# Patient Record
Sex: MALE | Race: WHITE
[De-identification: names, ages, dates, MRNs, and addresses within clinical notes are randomized per-mention and may not be internally consistent; named-entity substitution may affect disease eponyms.]

---

## 2017-07-05 ENCOUNTER — HOSPITAL ENCOUNTER (INPATIENT)
Dept: HOSPITAL 84 - D.ER | Age: 52
LOS: 2 days | Discharge: HOME | DRG: 65 | End: 2017-07-07
Attending: FAMILY MEDICINE | Admitting: FAMILY MEDICINE
Payer: COMMERCIAL

## 2017-07-05 VITALS — BODY MASS INDEX: 41.23 KG/M2 | WEIGHT: 210 LBS | HEIGHT: 60 IN

## 2017-07-05 DIAGNOSIS — G47.33: ICD-10-CM

## 2017-07-05 DIAGNOSIS — E78.5: ICD-10-CM

## 2017-07-05 DIAGNOSIS — I63.9: Primary | ICD-10-CM

## 2017-07-05 DIAGNOSIS — I25.10: ICD-10-CM

## 2017-07-05 DIAGNOSIS — R53.1: ICD-10-CM

## 2017-07-05 DIAGNOSIS — I10: ICD-10-CM

## 2017-07-05 DIAGNOSIS — L50.8: ICD-10-CM

## 2017-07-05 DIAGNOSIS — E87.1: ICD-10-CM

## 2017-07-05 DIAGNOSIS — I69.351: ICD-10-CM

## 2017-07-05 DIAGNOSIS — E11.65: ICD-10-CM
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
## 2017-07-05 DIAGNOSIS — D64.9: ICD-10-CM

## 2017-07-05 DIAGNOSIS — N17.9: ICD-10-CM

## 2017-07-05 DIAGNOSIS — T50.8X5A: ICD-10-CM

## 2017-07-05 LAB
ALBUMIN SERPL-MCNC: 3.7 G/DL (ref 3.4–5)
ALP SERPL-CCNC: 200 U/L (ref 46–116)
ALT SERPL-CCNC: 182 U/L (ref 10–68)
ANION GAP SERPL CALC-SCNC: 14.1 MMOL/L (ref 8–16)
APTT BLD: 25.4 SECONDS (ref 22.8–39.4)
BASOPHILS NFR BLD AUTO: 0.1 % (ref 0–2)
BILIRUB SERPL-MCNC: 0.3 MG/DL (ref 0.2–1.3)
BUN SERPL-MCNC: 15 MG/DL (ref 7–18)
CALCIUM SERPL-MCNC: 9.2 MG/DL (ref 8.5–10.1)
CHLORIDE SERPL-SCNC: 98 MMOL/L (ref 98–107)
CK SERPL-CCNC: 118 UL (ref 21–232)
CO2 SERPL-SCNC: 27.6 MMOL/L (ref 21–32)
CREAT SERPL-MCNC: 1.5 MG/DL (ref 0.6–1.3)
EOSINOPHIL NFR BLD: 1.8 % (ref 0–7)
ERYTHROCYTE [DISTWIDTH] IN BLOOD BY AUTOMATED COUNT: 12.6 % (ref 11.5–14.5)
GLOBULIN SER-MCNC: 3.3 G/L
GLUCOSE SERPL-MCNC: 251 MG/DL (ref 74–106)
HCT VFR BLD CALC: 41.9 % (ref 42–54)
HGB BLD-MCNC: 14 G/DL (ref 13.5–17.5)
IMM GRANULOCYTES NFR BLD: 0.2 % (ref 0–5)
INR PPP: 0.93 (ref 0.85–1.17)
LYMPHOCYTES NFR BLD AUTO: 19 % (ref 15–50)
MCH RBC QN AUTO: 30.7 PG (ref 26–34)
MCHC RBC AUTO-ENTMCNC: 33.4 G/DL (ref 31–37)
MCV RBC: 91.9 FL (ref 80–100)
MONOCYTES NFR BLD: 8.9 % (ref 2–11)
NEUTROPHILS NFR BLD AUTO: 70 % (ref 40–80)
OSMOLALITY SERPL CALC.SUM OF ELEC: 278 MOSM/KG (ref 275–300)
PLATELET # BLD: 248 10X3/UL (ref 130–400)
PMV BLD AUTO: 11 FL (ref 7.4–10.4)
POTASSIUM SERPL-SCNC: 4.7 MMOL/L (ref 3.5–5.1)
PROT SERPL-MCNC: 7 G/DL (ref 6.4–8.2)
PROTHROMBIN TIME: 12.3 SECONDS (ref 11.6–15)
RBC # BLD AUTO: 4.56 10X6/UL (ref 4.2–6.1)
SODIUM SERPL-SCNC: 135 MMOL/L (ref 136–145)
TROPONIN I SERPL-MCNC: < 0.017 NG/ML (ref 0–0.06)
WBC # BLD AUTO: 8.5 10X3/UL (ref 4.8–10.8)

## 2017-07-05 NOTE — EC
PATIENT:STEPHEN BHATTI               DATE OF SERVICE: 07/07/17
SEX: M                                  MEDICAL RECORD: [id number]
DATE OF BIRTH: [birth year]                        LOCATION:D.MS [id number]
AGE OF PATIENT: 51                             ADMISSION DATE: 07/05/17
 
REFERRING PHYSICIAN:                               
 
INTERPRETING PHYSICIAN: KATALINA LUNA MD             
 
 
 
                             ECHOCARDIOGRAM REPORT
  ECHO CHARGES 4               ECHO COMPLETE            
 
 
 
CLINICAL DIAGNOSIS: L MCA LACUNE/ ASSESS FOR CLOT 
                    HX OF CAD/STENTS/HTN          
                         ECHOCARDIOGRAPHIC MEASUREMENTS
      (adult normal given)
        AC root (d.<3.7cm) 4.1    LV Septum d (<1.2 cm> 1.5 
           Valve Excursion 1.9      LV Septum (systole) 2.0 
     Left Atria (s.<4.0cm> 2.7           LVPW d(<1.2cm) 1.6 
             RV (d.<2.3cm) 3.6            LVPW (sytole) 2.2 
       LV diastole(<5.6CM) 5.2        MV E-F(>70mm/sec)     
                LV systole 3.2            LVOT Diameter 2.0 
            MV exc.(>10mm) 1.5 
Est.ejection fraction (50-75%)     Pericardial Effusion N
 
   DOPPLER:
     LVIT       A 91.0 E 82.0
       LA      RVSP 17  
     LVOT 98   AOP1/2T     
  Asc. Ao 158 
     RVOT 94  
       RA     
        
 AV Gradient Peak 9.52  AV Mean 4.87  AV Area 2.5 
 MV Gradient Peak 7.45  MV Mean 2.13  MV Area     
   COMMENTS: PA  CM/SEC OR 3.55 MMHG,  RVSP IS      
              17 MMHG                                     
 
 Cardiac Sonographer: Brianda CRUMP              
      Cardiologist:1          Dr. Luna                
             TAPE# PACS           
                                                                                     
 
 
TWO-DIMENSIONAL ECHOCARDIOGRAM WITH DOPPLER 
 
1. Left ventricular chamber size is within normal limits.  Left ventricular systolic
function is normal.  Overall ejection fraction is estimated at 55 percent.
2. Left atrium, right atrium, and right ventricular chamber sizes are within normal
limits.
3. Valvular structures have normal structure and motion.
4. Doppler interrogation only reveals trace tricuspid regurgitation; no other
valvular insufficiency or stenosis.  
5. No evidence of pericardial effusion or left ventricular thrombus.   
 
 
 
ECHOCARDIOGRAM REPORT                          [id number]    STEPHEN BHATTI       
 
 
6. No cardiac source of neurologic emboli.
                                           
                                           KATALINA LUNA MD             
 
 
 
 
 
 
 
 
 
 
 
 
 
 
 
 
 
 
 
 
 
 
 
 
 
 
 
 
 
 
 
 
 
 
 
 
 
 
 
 
 
 
 
CC:                                                             [id number]
DICTATION DATE: 07/08/17 1500     : DM  07/10/17 1145      DIS IN  
                                                                      07/07/17
[facility]                                          
[address] [place] [address]

## 2017-07-06 VITALS — SYSTOLIC BLOOD PRESSURE: 135 MMHG | DIASTOLIC BLOOD PRESSURE: 85 MMHG

## 2017-07-06 VITALS — DIASTOLIC BLOOD PRESSURE: 84 MMHG | SYSTOLIC BLOOD PRESSURE: 124 MMHG

## 2017-07-06 VITALS — SYSTOLIC BLOOD PRESSURE: 129 MMHG | DIASTOLIC BLOOD PRESSURE: 78 MMHG

## 2017-07-06 VITALS — DIASTOLIC BLOOD PRESSURE: 78 MMHG | SYSTOLIC BLOOD PRESSURE: 122 MMHG

## 2017-07-06 VITALS — DIASTOLIC BLOOD PRESSURE: 77 MMHG | SYSTOLIC BLOOD PRESSURE: 133 MMHG

## 2017-07-06 VITALS — DIASTOLIC BLOOD PRESSURE: 80 MMHG | SYSTOLIC BLOOD PRESSURE: 134 MMHG

## 2017-07-06 LAB
ALBUMIN SERPL-MCNC: 3.4 G/DL (ref 3.4–5)
ALP SERPL-CCNC: 143 U/L (ref 46–116)
ALT SERPL-CCNC: 150 U/L (ref 10–68)
ANION GAP SERPL CALC-SCNC: 13.9 MMOL/L (ref 8–16)
BASOPHILS NFR BLD AUTO: 0.1 % (ref 0–2)
BILIRUB SERPL-MCNC: 0.43 MG/DL (ref 0.2–1.3)
BUN SERPL-MCNC: 14 MG/DL (ref 7–18)
CALCIUM SERPL-MCNC: 8.7 MG/DL (ref 8.5–10.1)
CHLORIDE SERPL-SCNC: 98 MMOL/L (ref 98–107)
CHOLEST/HDLC SERPL: 3.3 RATIO (ref 2.3–4.9)
CO2 SERPL-SCNC: 24.9 MMOL/L (ref 21–32)
CREAT SERPL-MCNC: 1.1 MG/DL (ref 0.6–1.3)
EOSINOPHIL NFR BLD: 2 % (ref 0–7)
ERYTHROCYTE [DISTWIDTH] IN BLOOD BY AUTOMATED COUNT: 12.7 % (ref 11.5–14.5)
EST. AVERAGE GLUCOSE BLD GHB EST-MCNC: 200 MG/DL (ref 74–154)
GLOBULIN SER-MCNC: 3.6 G/L
GLUCOSE SERPL-MCNC: 227 MG/DL (ref 74–106)
HBA1C MFR BLD: 8.6 % (ref 4.8–6)
HCT VFR BLD CALC: 40.5 % (ref 42–54)
HDLC SERPL-MCNC: 65 MG/DL (ref 32–96)
HGB BLD-MCNC: 13.6 G/DL (ref 13.5–17.5)
IMM GRANULOCYTES NFR BLD: 0.3 % (ref 0–5)
LDL-HDL RATIO: 1.8 RATIO (ref 1.5–3.5)
LDLC SERPL-MCNC: 114 MG/DL (ref 0–100)
LYMPHOCYTES NFR BLD AUTO: 19.5 % (ref 15–50)
MCH RBC QN AUTO: 30.6 PG (ref 26–34)
MCHC RBC AUTO-ENTMCNC: 33.6 G/DL (ref 31–37)
MCV RBC: 91 FL (ref 80–100)
MONOCYTES NFR BLD: 6.5 % (ref 2–11)
NEUTROPHILS NFR BLD AUTO: 71.6 % (ref 40–80)
OSMOLALITY SERPL CALC.SUM OF ELEC: 273 MOSM/KG (ref 275–300)
PLATELET # BLD: 226 10X3/UL (ref 130–400)
PMV BLD AUTO: 10.9 FL (ref 7.4–10.4)
POTASSIUM SERPL-SCNC: 3.8 MMOL/L (ref 3.5–5.1)
PROT SERPL-MCNC: 7 G/DL (ref 6.4–8.2)
RBC # BLD AUTO: 4.45 10X6/UL (ref 4.2–6.1)
SODIUM SERPL-SCNC: 133 MMOL/L (ref 136–145)
TRIGL SERPL-MCNC: 164 MG/DL (ref 30–200)
WBC # BLD AUTO: 7.5 10X3/UL (ref 4.8–10.8)

## 2017-07-06 NOTE — NUR
ALERT,ORIENTED X 3. NO COMPLAINTS VOICED. SL INTACT TO LEFT ARM WITHOUT
REDNESS OR EDEMA NOTED. CL IN REACH

## 2017-07-06 NOTE — NUR
ADMITTED TO ROOM 2240 VIA WHEELCHAIR FROM ER. CC OF RIGHT SIDED WEAKENSS.
ALERT,ORIENTED X 3. SL TO LEFT AC WITHOUT REDNESS OR EDEMA NOTED..ORIENTED TO
ROOM CL IN REACH

## 2017-07-06 NOTE — NUR
PATIENT BACK TO ROOM FROM CT. HAD ALLERGIC REACTION TO DYE WHILE DOWN THERE.
VS STABLE. BS WNL. MEDS GIVEN BY RAPID RESPONSE NURSE. DR. BUENROSTRO IN TO SEE
PATIENT. FAMILY AT BEDSIDE. CALL LIGHT WITHIN REACH.

## 2017-07-06 NOTE — NUR
RN NOTE:  PT RESTING ON LEFT SIDE WITH EYES CLOSED AND UNLABORED BREATHING.
CPAP IN PLACE.  WILL CONTINUE TO MONITOR FOR NEEDS.

## 2017-07-06 NOTE — NUR
Patient Name: STEPEHN BHATTI                                   Admission
Status: ER
Accout number: [id number]                            Admission Date:
2017
: 1965                                                      Admission
Diagnosis:
Attending: JULIA                                              Current LOS:
 1
 
Anticipated DC Date:
2017
Planned Disposition: Home
Primary Insurance: [employer] [facility]
 
 
Discharge Planning Comments:
CM MET WITH PATIENT REGARDING D/C NEEDS AND PLANS. PATIENT STATED HE LIVES
WITH HIS WIFE AND SHE WILL DRIVE HIM HOME AT DISCHARGE. PATIENT STATED THERE
IS ONE STEP TO ENTER HOME AND A STAIRCASE IN THE HOME. PATIENT STATED HE IS
INDEPENDENT WITH HIS CARE AND HAS A WALKER, CANE, CPAP, AND GLUCOMETER (CHECKS
2X WEEK) AT HOME. PATIENTS PCP IS DR. ALVES AND PHARMACY IS [facility] ON
[facility]. PATIENT STATED HE DOES NOT WANT HOME HEALTH. CM WILL CONTINUE TO
FOLLOW PATIENT WITH D/C NEEDS AND PLANS.
 
PCP  DR. JOSELYN VILLALBA ON [facility]-  [phone]
KYLE BHATTI (WIFE)  [phone]
 
 
 
 
 
: Ursula Gilliam
 
Is the patient Alert and Oriented? Yes  0
* How many steps to enter\exit or inside your home? STAIRCASE  0
* PCP DR. ALVES  0
* Pharmacy [facility] ([facility])  0
* Preadmission Environment Home with Family  0
* ADLs Independent  0
* Equipment Cane
CPAP
Glucometer
Walker  0
* List name and contact numbers for known caregivers / representatives who
currently or will assist patient after discharge: KYLE BHATTI  [phone]  0
 
* Community resources currently utilized None  0
* Additional services required to return to the preadmission environment? Yes
0
* Can the patient safely return to the preadmission environment? Yes  0
* Has this patient been hospitalized within the prior 30 days at any hospital?
No  0
    Grand Total:  0

## 2017-07-06 NOTE — NUR
PATIENT REFUSED INSULIN.STATING HE HAS NEVER TAKEN IT AND IS A LITTLE
SCARED. WISHES TO TALK WITH HIS DOCTOR BEFORE TAKING IT.

## 2017-07-06 NOTE — NUR
OT NOTE: PT OUT FOR MRI; WILL RE ATTEMPT TOMORROW.
                THANK YOU FOR REFERAL,
                  JULIA BRUNNER, OTR/L

## 2017-07-07 VITALS — DIASTOLIC BLOOD PRESSURE: 93 MMHG | SYSTOLIC BLOOD PRESSURE: 146 MMHG

## 2017-07-07 VITALS — DIASTOLIC BLOOD PRESSURE: 79 MMHG | SYSTOLIC BLOOD PRESSURE: 121 MMHG

## 2017-07-07 VITALS — DIASTOLIC BLOOD PRESSURE: 81 MMHG | SYSTOLIC BLOOD PRESSURE: 122 MMHG

## 2017-07-07 VITALS — SYSTOLIC BLOOD PRESSURE: 120 MMHG | DIASTOLIC BLOOD PRESSURE: 84 MMHG

## 2017-07-07 LAB
ALBUMIN SERPL-MCNC: 3.3 G/DL (ref 3.4–5)
ALP SERPL-CCNC: 133 U/L (ref 46–116)
ALT SERPL-CCNC: 145 U/L (ref 10–68)
ANION GAP SERPL CALC-SCNC: 12.9 MMOL/L (ref 8–16)
BASOPHILS NFR BLD AUTO: 0.1 % (ref 0–2)
BILIRUB SERPL-MCNC: 0.51 MG/DL (ref 0.2–1.3)
BUN SERPL-MCNC: 13 MG/DL (ref 7–18)
CALCIUM SERPL-MCNC: 8.8 MG/DL (ref 8.5–10.1)
CHLORIDE SERPL-SCNC: 101 MMOL/L (ref 98–107)
CHOLEST/HDLC SERPL: 3.7 RATIO (ref 2.3–4.9)
CO2 SERPL-SCNC: 26 MMOL/L (ref 21–32)
CREAT SERPL-MCNC: 1 MG/DL (ref 0.6–1.3)
EOSINOPHIL NFR BLD: 2.7 % (ref 0–7)
ERYTHROCYTE [DISTWIDTH] IN BLOOD BY AUTOMATED COUNT: 12.7 % (ref 11.5–14.5)
GLOBULIN SER-MCNC: 3.6 G/L
GLUCOSE SERPL-MCNC: 181 MG/DL (ref 74–106)
HCT VFR BLD CALC: 40.8 % (ref 42–54)
HDLC SERPL-MCNC: 58 MG/DL (ref 32–96)
HGB BLD-MCNC: 13.7 G/DL (ref 13.5–17.5)
IMM GRANULOCYTES NFR BLD: 0.3 % (ref 0–5)
LDL-HDL RATIO: 2 RATIO (ref 1.5–3.5)
LDLC SERPL-MCNC: 118 MG/DL (ref 0–100)
LYMPHOCYTES NFR BLD AUTO: 18.8 % (ref 15–50)
MCH RBC QN AUTO: 30.7 PG (ref 26–34)
MCHC RBC AUTO-ENTMCNC: 33.6 G/DL (ref 31–37)
MCV RBC: 91.5 FL (ref 80–100)
MONOCYTES NFR BLD: 7.9 % (ref 2–11)
NEUTROPHILS NFR BLD AUTO: 70.2 % (ref 40–80)
OSMOLALITY SERPL CALC.SUM OF ELEC: 276 MOSM/KG (ref 275–300)
PLATELET # BLD: 217 10X3/UL (ref 130–400)
PMV BLD AUTO: 10.7 FL (ref 7.4–10.4)
POTASSIUM SERPL-SCNC: 3.9 MMOL/L (ref 3.5–5.1)
PROT SERPL-MCNC: 6.9 G/DL (ref 6.4–8.2)
RBC # BLD AUTO: 4.46 10X6/UL (ref 4.2–6.1)
SODIUM SERPL-SCNC: 136 MMOL/L (ref 136–145)
TRIGL SERPL-MCNC: 194 MG/DL (ref 30–200)
WBC # BLD AUTO: 7.1 10X3/UL (ref 4.8–10.8)

## 2017-07-07 NOTE — NUR
PATIENT RECIEVED DISCHARGE INSTRUCTIONS. VERBALIZED UNDERSTANDING. NO
QUESTIONS AT THIS TIME. IV REMOVED WITH CATH TIP INTACT. CALL LIGHT WITHIN
REACH.

## 2018-07-20 ENCOUNTER — HOSPITAL ENCOUNTER (OUTPATIENT)
Dept: HOSPITAL 84 - D.CATH | Age: 53
Discharge: HOME | End: 2018-07-20
Attending: INTERNAL MEDICINE
Payer: COMMERCIAL

## 2018-07-20 VITALS
BODY MASS INDEX: 41.31 KG/M2 | BODY MASS INDEX: 41.31 KG/M2 | HEIGHT: 60 IN | WEIGHT: 210.44 LBS | HEIGHT: 60 IN | WEIGHT: 210.44 LBS

## 2018-07-20 VITALS — DIASTOLIC BLOOD PRESSURE: 73 MMHG | SYSTOLIC BLOOD PRESSURE: 139 MMHG

## 2018-07-20 DIAGNOSIS — I25.119: Primary | ICD-10-CM

## 2018-07-20 DIAGNOSIS — I25.82: ICD-10-CM

## 2018-07-20 LAB
ANION GAP SERPL CALC-SCNC: 14.5 MMOL/L (ref 8–16)
BASOPHILS NFR BLD AUTO: 0.2 % (ref 0–2)
BUN SERPL-MCNC: 15 MG/DL (ref 7–18)
CALCIUM SERPL-MCNC: 9.1 MG/DL (ref 8.5–10.1)
CHLORIDE SERPL-SCNC: 102 MMOL/L (ref 98–107)
CO2 SERPL-SCNC: 27.3 MMOL/L (ref 21–32)
CREAT SERPL-MCNC: 0.9 MG/DL (ref 0.6–1.3)
EOSINOPHIL NFR BLD: 1.5 % (ref 0–7)
ERYTHROCYTE [DISTWIDTH] IN BLOOD BY AUTOMATED COUNT: 13.9 % (ref 11.5–14.5)
GLUCOSE SERPL-MCNC: 117 MG/DL (ref 74–106)
HCT VFR BLD CALC: 40.1 % (ref 42–54)
HGB BLD-MCNC: 13.7 G/DL (ref 13.5–17.5)
IMM GRANULOCYTES NFR BLD: 0.5 % (ref 0–5)
LYMPHOCYTES NFR BLD AUTO: 19.8 % (ref 15–50)
MCH RBC QN AUTO: 31.6 PG (ref 26–34)
MCHC RBC AUTO-ENTMCNC: 34.2 G/DL (ref 31–37)
MCV RBC: 92.6 FL (ref 80–100)
MONOCYTES NFR BLD: 12.7 % (ref 2–11)
NEUTROPHILS NFR BLD AUTO: 65.3 % (ref 40–80)
OSMOLALITY SERPL CALC.SUM OF ELEC: 279 MOSM/KG (ref 275–300)
PLATELET # BLD: 210 10X3/UL (ref 130–400)
PMV BLD AUTO: 10.7 FL (ref 7.4–10.4)
POTASSIUM SERPL-SCNC: 4.8 MMOL/L (ref 3.5–5.1)
RBC # BLD AUTO: 4.33 10X6/UL (ref 4.2–6.1)
SODIUM SERPL-SCNC: 139 MMOL/L (ref 136–145)
WBC # BLD AUTO: 5.9 10X3/UL (ref 4.8–10.8)

## 2018-07-25 ENCOUNTER — HOSPITAL ENCOUNTER (OUTPATIENT)
Dept: HOSPITAL 84 - D.CATH | Age: 53
Discharge: HOME | End: 2018-07-25
Attending: INTERNAL MEDICINE
Payer: COMMERCIAL

## 2018-07-25 VITALS — SYSTOLIC BLOOD PRESSURE: 114 MMHG | DIASTOLIC BLOOD PRESSURE: 68 MMHG

## 2018-07-25 VITALS
WEIGHT: 210.44 LBS | BODY MASS INDEX: 41.31 KG/M2 | HEIGHT: 60 IN | WEIGHT: 210.44 LBS | HEIGHT: 60 IN | BODY MASS INDEX: 41.31 KG/M2

## 2018-07-25 DIAGNOSIS — I25.119: Primary | ICD-10-CM

## 2018-07-25 DIAGNOSIS — Z95.5: ICD-10-CM

## 2018-07-25 DIAGNOSIS — Z01.812: ICD-10-CM

## 2018-07-25 LAB
ANION GAP SERPL CALC-SCNC: 10 MMOL/L (ref 8–16)
BASOPHILS NFR BLD AUTO: 0.3 % (ref 0–2)
BUN SERPL-MCNC: 14 MG/DL (ref 7–18)
CALCIUM SERPL-MCNC: 8.9 MG/DL (ref 8.5–10.1)
CHLORIDE SERPL-SCNC: 102 MMOL/L (ref 98–107)
CO2 SERPL-SCNC: 28.2 MMOL/L (ref 21–32)
CREAT SERPL-MCNC: 1 MG/DL (ref 0.6–1.3)
EOSINOPHIL NFR BLD: 2.1 % (ref 0–7)
ERYTHROCYTE [DISTWIDTH] IN BLOOD BY AUTOMATED COUNT: 13.6 % (ref 11.5–14.5)
GLUCOSE SERPL-MCNC: 133 MG/DL (ref 74–106)
HCT VFR BLD CALC: 42.7 % (ref 42–54)
HGB BLD-MCNC: 14.6 G/DL (ref 13.5–17.5)
IMM GRANULOCYTES NFR BLD: 0.7 % (ref 0–5)
LYMPHOCYTES NFR BLD AUTO: 17.4 % (ref 15–50)
MCH RBC QN AUTO: 31.7 PG (ref 26–34)
MCHC RBC AUTO-ENTMCNC: 34.2 G/DL (ref 31–37)
MCV RBC: 92.6 FL (ref 80–100)
MONOCYTES NFR BLD: 7.6 % (ref 2–11)
NEUTROPHILS NFR BLD AUTO: 71.9 % (ref 40–80)
OSMOLALITY SERPL CALC.SUM OF ELEC: 274 MOSM/KG (ref 275–300)
PLATELET # BLD: 230 10X3/UL (ref 130–400)
PMV BLD AUTO: 10.6 FL (ref 7.4–10.4)
POTASSIUM SERPL-SCNC: 4.2 MMOL/L (ref 3.5–5.1)
RBC # BLD AUTO: 4.61 10X6/UL (ref 4.2–6.1)
SODIUM SERPL-SCNC: 136 MMOL/L (ref 136–145)
WBC # BLD AUTO: 7.6 10X3/UL (ref 4.8–10.8)

## 2020-03-06 ENCOUNTER — HOSPITAL ENCOUNTER (OUTPATIENT)
Dept: HOSPITAL 84 - D.HCCARDIO | Age: 55
Discharge: HOME | End: 2020-03-06
Attending: INTERNAL MEDICINE
Payer: COMMERCIAL

## 2020-03-06 DIAGNOSIS — I25.10: Primary | ICD-10-CM

## 2020-03-20 ENCOUNTER — HOSPITAL ENCOUNTER (OUTPATIENT)
Dept: HOSPITAL 84 - D.CATH | Age: 55
Discharge: HOME | End: 2020-03-20
Attending: INTERNAL MEDICINE
Payer: COMMERCIAL

## 2020-03-20 VITALS
BODY MASS INDEX: 45.8 KG/M2 | BODY MASS INDEX: 45.8 KG/M2 | WEIGHT: 233.29 LBS | HEIGHT: 60 IN | WEIGHT: 233.29 LBS | HEIGHT: 60 IN

## 2020-03-20 VITALS — DIASTOLIC BLOOD PRESSURE: 68 MMHG | SYSTOLIC BLOOD PRESSURE: 115 MMHG

## 2020-03-20 DIAGNOSIS — I10: ICD-10-CM

## 2020-03-20 DIAGNOSIS — I25.119: Primary | ICD-10-CM

## 2020-03-20 DIAGNOSIS — R06.09: ICD-10-CM

## 2020-03-20 DIAGNOSIS — E78.5: ICD-10-CM

## 2020-03-20 DIAGNOSIS — R07.9: ICD-10-CM

## 2020-03-20 LAB
ALT SERPL-CCNC: 37 U/L (ref 10–68)
ANION GAP SERPL CALC-SCNC: 11.9 MMOL/L (ref 8–16)
BASOPHILS NFR BLD AUTO: 0.2 % (ref 0–2)
BUN SERPL-MCNC: 13 MG/DL (ref 7–18)
CALCIUM SERPL-MCNC: 8.7 MG/DL (ref 8.5–10.1)
CHLORIDE SERPL-SCNC: 103 MMOL/L (ref 98–107)
CHOLEST/HDLC SERPL: 2 RATIO (ref 2.3–4.9)
CO2 SERPL-SCNC: 27.1 MMOL/L (ref 21–32)
CREAT SERPL-MCNC: 0.9 MG/DL (ref 0.6–1.3)
EOSINOPHIL NFR BLD: 1.4 % (ref 0–7)
ERYTHROCYTE [DISTWIDTH] IN BLOOD BY AUTOMATED COUNT: 13.2 % (ref 11.5–14.5)
GLUCOSE SERPL-MCNC: 105 MG/DL (ref 74–106)
HCT VFR BLD CALC: 38 % (ref 42–54)
HDLC SERPL-MCNC: 82 MG/DL (ref 32–96)
HGB BLD-MCNC: 12.7 G/DL (ref 13.5–17.5)
IMM GRANULOCYTES NFR BLD: 0.2 % (ref 0–5)
LDL-HDL RATIO: 0.8 RATIO (ref 1.5–3.5)
LDLC SERPL-MCNC: 69 MG/DL (ref 0–100)
LYMPHOCYTES NFR BLD AUTO: 17.3 % (ref 15–50)
MCH RBC QN AUTO: 32.1 PG (ref 26–34)
MCHC RBC AUTO-ENTMCNC: 33.4 G/DL (ref 31–37)
MCV RBC: 96 FL (ref 80–100)
MONOCYTES NFR BLD: 10.3 % (ref 2–11)
NEUTROPHILS NFR BLD AUTO: 70.6 % (ref 40–80)
OSMOLALITY SERPL CALC.SUM OF ELEC: 275 MOSM/KG (ref 275–300)
PLATELET # BLD: 218 10X3/UL (ref 130–400)
PMV BLD AUTO: 9.9 FL (ref 7.4–10.4)
POTASSIUM SERPL-SCNC: 4 MMOL/L (ref 3.5–5.1)
RBC # BLD AUTO: 3.96 10X6/UL (ref 4.2–6.1)
SODIUM SERPL-SCNC: 138 MMOL/L (ref 136–145)
TRIGL SERPL-MCNC: 75 MG/DL (ref 30–200)
WBC # BLD AUTO: 5 10X3/UL (ref 4.8–10.8)

## 2020-03-20 NOTE — NUR
2 ML AIR REMOVED ZBAND, TOTAL 5 ML OUT, NO S/S BLEEDING OR HEMATOMA R
RADIAL. PT UP AT BEDSIDE TO USE URINAL.

## 2020-03-20 NOTE — OP
PATIENT NAME:  STEPHEN BHATTI                         MEDICAL RECORD: [id number]
:[birth year]                                             LOCATION:D.CAT          
                                                         ADMISSION DATE:        
SURGEON:  KATALINA BURCIAGA MD             
 
 
DATE OF OPERATION:  2020
 
PROCEDURES:
1.  PTCA stent RCA.
2.  PTCA stent LAD.
3.  IFR.
4.  Left heart catheterization.
5.  Selective coronary angiography.
6.  Left ventriculogram.
 
INDICATION:  Angina and coronary artery disease.
 
PROCEDURE IN DETAIL:  After informed consent was obtained and after a detailed
description of risks, benefits as well as alternative therapies, the patient
elected to proceed with angiogram and angioplasty.  The right radial area was
prepped and draped in normal sterile fashion.  Right radial artery was
cannulated via modified Seldinger technique with placement of 6-[place] sheath. 
All catheters exchanged through this sheath.
 
FINDINGS:  Left ventriculogram was performed in standard 30-degree TAVARES view,
reveals good cardiac wall motion, ejection fraction estimated at 60%.
 
SELECTIVE CORONARY ANGIOGRAPHY:
1.  Left main is with no significant angiographic disease.
2.  Left anterior descending has 90% stenosis in the proximal aspect and IFR was
abnormal.
3.  Left circumflex has mild irregularities, but no flow-limiting stenosis.
4.  The right coronary has multiple previously placed stents with areas of 90%
to 95% in-stent restenosis.
 
PTCA STENT OF THE LAD:  The stent used was a 3.0 x 18 mm Dio.  Result was 0%
residual stenosis.
 
PTCA STENT OF THE RCA:  Stents used were 3.0 x 30 and 3.0 x 15 mm Dio.  Result
was 0% residual stenosis.
 
OVERALL IMPRESSION:  Successful percutaneous transluminal coronary angioplasty
stent of the left anterior descending and right coronary artery, both going from
90% to 95% initial stenosis to 0% residual.
 
TRANSINT:[id number] Voice Confirmation ID: [id number] DOCUMENT ID: [id number]
                                           
                                           KATALINA BURCIAGA MD             
 
CC:                                                             [id number]
DICTATION DATE: 20 1034     :     20 1216      [id number] [place] [address]

## 2020-03-20 NOTE — NUR
R RADIAL ARTERY PALPABLE ABOVE AND BELOW BAND. NO S/S BLEEDING OR
HEMATOMA. DISCUSSED IMMOBILIZER USE AT HOME. /75, NSR 76, RR 16
SAT 98% RA.

## 2020-03-20 NOTE — HEMODYNAMI
PATIENT:STEPHEN BHATTI                                MEDICAL RECORD: [id number]
: [birth year]                                            LOCATION:D.CAT          
ACCT# [id number]                                       ADMISSION DATE: 20
 
 
 Generatedon:3/20/941501:39
Patient name: STEPHEN BHATTI Patient #: [id number] Visit #: [id number] SSN: [id number]
[id number] : 1965
Date of study: 3/20/2020
Page: Of
Hemodynamic Procedure Report
****************************
Patient Data
Patient Demographics
Procedure consent was obtained
First Name: STEPHEN           Gender: Male
Last Name: MAGY            : 1965
Middle Initial: KANDICE       Age: 54 year(s)
Patient #: [id number]       Race: 
Visit #: [id number]
SSN: [id number]
Accession #:
[id number]
Additional ID: [id number]
Contact details
Address: [address] [place] [address]       Phone: [phone]
circle
State: [place]
City: [place]
Zip code: [address]
Past Medical History
Allergies
Allergen      Reaction      Date       Comments
Reported
Other allergy               2018  GADOLINIUM CONTRAST
Other allergy               3/20/2020  gadolinium-containging
contrast media
Admission
Admission Data
Admission Date: 3/20/2020   Admission Time: 7:21
Arrival Date: 3/20/2020     Arrival Time: 0:00
Insurance Payor: Private
health insurance
[facility] #: [id number]
Height (in.): 59.84         BSA: 1.99 (m2)
Height (cm.): 152           BMI: 45.88 (kg/m2)
Weight (lbs.): 233.69
Weight (kg.): 106
Lab Results
Lab Result Date: 3/20/2020  Lab Result Time: 0:00
Biochemistry
Name         Units    Result                Min      Max
BUN          mg/dl    13       --(--*-)--   7        18
Creatinine   mg/dl    0.9      --(-*--)--   0.6      1.3
eGFR         ml/min   90       --(*---)--   90       120
NONAFRICAN
CBC
Name         Units    Result                Min      Max
Hematocrit   %        38       *-(----)--   42       54
 
Hemoglobin   g/dl     12.7     -*(----)--   13.5     17.5
Procedure
Procedure Types
Cath Procedure
Diagnostic Procedure
[facility] w/Coronaries
FFR/IVUS
FFR Initial
Sedation Charges
Moderate Sedation up to 30 minutes
PCI Procedure
Coronary Stent
Coronary Stent Initial x2
Hemochron ACT Test
Procedure Description
Procedure Date
Procedure Date: 3/20/2020
Procedure Start Time: 10:07
Procedure End Time: 10:37
Procedure Staff
Name                            Function
Kenneth Luna MD                Performing Physician
Kerwin Hernandez RN                  Nurse
Megan Almeida RT              Monitor
Lesvia Ascencio RT               Scrub
Iris Diaz RN                Monitor
Procedure Data
Cath Procedure
Fluoroscopy
Diagnostic fluoroscopy      Total fluoroscopy Time: 8.8
time: 8.8 min               min
Diagnostic fluoroscopy      Total fluoroscopy dose:
dose: 1332 mGy              1332 mGy
Contrast Material
Contrast Material Type                       Amount (ml)
Isovue 300                                   171
Entry Location
Entry     Primary  Successful  Side  Size  Upsize Upsize Entry    Closure     Allen
ccessful  Closure
Location                             (Fr)  1 (Fr) 2 (Fr) Remarks  Device        
          Remarks
Radial                         Right 6 Fr                         Manual
artery                               Short                        Compression
Estimated blood loss: 10 ml
Diagnostic catheters
Device Type               Used For           End Catheter
Placement
DIAGNOSTIC [place] 110cm 5  Procedure
Fr catheter (407454)
DIAGNOSTIC AR MOD 5Fr     Procedure
Catheter ([id number])
Procedure Complications
No complications
Procedure Medications
Medication           Administration Route Dosage
Oxygen               etCO2 Nasal cannula  2 l/min
Lidocaine 2%         added to field       20
Heparin Flush Bag    added to field       2 bags
(1000units/500ml NS)
 
0.9% NaCl            I.V.                 100 ml/hr
Radial Cocktail      I.A.                 1 syringe
(Verapamil 2mg/Nitro
400mcg/Heparin
1500units)
Versed               I.V.                 2 mg
Fentanyl             I.V.                 100 mcg
Versed               I.V.                 2 mg
Fentanyl             I.V.                 100 mcg
Versed               I.V.                 1 mg
Fentanyl             I.V.                 50 mcg
Heparin Bolus        I.V.                 4000 units
Integrilin (Bolus    I.V.                 9.5 ml
2mg/ml)
Versed               I.V.                 1 mg
Fentanyl             I.V.                 50 mcg
Plavix               P.O.                 600 mg
Hemodynamics
Rest
BSA: 1.99 (m2) HGB: 12.7 (g/dl) O2 Consumption: Estimated: 242.02 (ml/min) O2 Co
nsumption indexed:
Estimated:121.62 (ml/min/m) Heart Rate: 77 (bpm)
Snapshots
Pre Cath      Intra         NCS           Post Cath
Vital Signs
Time     Heart  Resp   SPO2 etCO2   NIBP (mmHg) Rhythm  Pain    Sedation
Rate   (ipm)  (%)  (mmHg)                      Status  Level
(bpm)
9:50:39  70     13     98   35.4    124/70(102) NSR     0 (11)  10(A)
, No
pain
9:55:10  87     14     93   32.4    116/66(99)  NSR     0 (11)  10(A)
, No
pain
9:59:34  82     15     95   37.7    116/72(89)  NSR     0 (11)  10(A)
, No
pain
10:04:01 81     18     94   0.7     114/65(87)  NSR     0 (11)  10(A)
, No
pain
10:08:25 81     14     95   0       118/67(97)  NSR     0 (11)  10(A)
, No
pain
10:12:51 88     16     94   0       116/66(88)  NSR     0 (11)  9(A)
, No
pain
10:17:17 85     19     94   0       107/59(80)  NSR     0 (11)  9(A)
, No
pain
10:21:37 100    61     93   25.6    113/66(83)  NSR     0 (11)  9(A)
, No
pain
10:25:59 91     66     94   27.9    122/67(83)  NSR     0 (11)  9(A)
, No
pain
10:30:26 95     27     96   35.4    118/68(89)  NSR     0 (11)  10(A)
, No
pain
10:34:50 87     42     97   30.9    116/68(93)  NSR     0 (11)  10(A)
, No
 
pain
Medications
Time     Medication       Route   Dose    Verified Delivered Reason          Not
es    Effectiveness
by       by
9:51:24  Oxygen           etCO2   2 l/min Kenneth Suresh    used for
Nasal           Cheryl Hernandez RN   procedure
cannula
9:51:35  Lidocaine 2%     added   20ml    Kenneth Cooper   for local
to      vial    Cheryl Luna MD  anesthetic
field
9:51:43  Heparin Flush    added   2 bags  Kenneth Cooper   used for
Bag              to              Cheryl Luna MD  procedure
(1000units/500ml field
NS)
9:51:53  0.9% NaCl        I.V.    100     Kenneth Suresh    Per physician
ml/hr   Cheryl Hernandez RN
10:02:07 Versed           I.V.    2 mg    Kenneth Suresh    for sedation
Cheryl Hernandez RN
10:02:14 Fentanyl         I.V.    100 mcg Kenneth  Buffie    for sedation
Cheryl Hernandez RN
10:06:27 Versed           I.V.    2 mg    Kenneth  Buffie    for sedation
Cheryl Hernandez RN
10:06:32 Fentanyl         I.V.    100 mcg Kenneth  Buffie    for sedation
Cheryl Hernandez RN
10:09:19 Radial Cocktail  I.A.    1       Kenneth  Kenneth   for
(Verapamil               syringe Cheryl Luna MD  vasodilation
2mg/Nitro
400mcg/Heparin
1500units)
10:11:06 Versed           I.V.    1 mg    Kenneth  Buffie    for sedation
Cheyrl Hernandez RN
10:11:10 Fentanyl         I.V.    50 mcg  Kenneth Kowalskiie    for sedation
Cheryl Hernandez RN
10:17:11 Heparin Bolus    I.V.    4000    Kennethsoila Suresh    for             harry
ified
units   Cheryl Hernandez RN   anticoagulation with dr luna
10:18:52 Integrilin       I.V.    9.5 ml  Kenneth Suresh    for             was
veronica
(Bolus 2mg/ml)                   Cheryl Hernandez RN   antiplatelet    0.5 ml
therapy         of vial
10:22:35 Versed           I.V.    1 mg    Kenneth Suresh    for sedation
Cheryl Hernandez RN
10:22:38 Fentanyl         I.V.    50 mcg  Kenneth Suresh    for sedation
Cheryl Hernandez RN
10:32:54 Plavix           P.O.    600 mg  Kenneth Suresh    for
Cheryl Hernandez RN   antiplatelet
therapy
Procedure Log
Time     Note
9:20:55  Informed consent obtained and on chart
9:35:06  Patient allergic to Other
allergygadolinium-containging contrast media
9:35:52  Arrival Date: 3/20/2020 12:00:00 AM
9:36:33  Insurance Payor : Private health insurance
9:36:38  Patient Height : 59.84 inches
9:36:45  Patient Weight : 233.69 lbs
9:37:28  Lab Result : Hemoglobin 12.7 g/dl
9:37:28  Lab Result : Hematocrit 38 %
 
9:37:28  Lab Result : eGFR NONAFRICAN 90 ml/min
9:37:28  Lab Result : BUN 13 mg/dl
9:37:28  Lab Result : Creatinine 0.9 mg/dl
9:37:38  Procedure Status Elective Heart Cath (OP).
9:38:23  Kerwin Hernandez RN sent for patient. Start room use.
9:38:27  Time tracking: Regular hours (M-F 7:00 - 5:00)
9:38:35  Plan of Care:Hemodynamics will remain stable., Cardiac
rhythm will remain stable., Comfort level will be
maintained., Respiratory function will remain
adequate., Patient/ family verbilizes understanding of
procedure., Procedure tolerated without complication.,
Recovers from procedure without complications..
9:39:28  **ACC** Patient presents with Stable Angina CCS
Anginal Class 3--Marked limitation of physical
activity, angina occurs with ordinary activity..
9:42:38  Patient diabetic? Yes.
9:42:41  If diabetic: On Metformin? Yes
9:42:53  If on Metformin: Last Dose? 3/17/2020
9:43:10  Patient received from Pre/Post Procedure Room to CCL 1
Alert and oriented. Tansferred to table in Supine
position.
9:43:13  Warm blankets applied, and charo hugger turned on for
patient comfort.
9:43:13  Correct patient and procedure confirmed by team.
9:43:18  ECG and BP/O2 sat monitors applied to patient.
9:43:40  H&P Date Dictated: 3/20/2020 H&P Addendum completed by
physician on day of procedure. (MUST COMPLETE FOR ALL
OUTPATIENTS), New H&P dictated by physician..
9:43:43  Pre-procedure instructions explained to patient.
9:43:44  Pre-op teaching completed and patient verbalized
understanding.
9:43:47  Family in patients room.
9:43:50  Patient NPO since Midnight.
9:43:54  Is the patient allergic to Iodine/contrast media? No.
9:43:58  Was the patient premedicated? Yes
9:44:11  Is patient on blood thinner?No
9:44:20  ----Pre-sedation anethsthesia assessment.----
9:44:24  Previous problem with sedation/anesthesia? No ?
9:44:26  Snore? Yes
9:44:29  Sleep apnea? Yes
9:44:32  Deviated septum? No
9:44:39  Opens mouth fully? Yes
9:44:42  Sticks out tongue? Yes
9:45:00  Airway obstruction? Yes SLEEP APNEA WEAR CPAP AT NIGHT
9:45:28  Dentures? No ?
9:49:14  Vital chart was started
9:49:19  Rhythm: sinus rhythm
9:49:21  Full Disclosure recording started
9:51:23  Baseline sample Acquired.
9:51:24  Oxygen 2 l/min etCO2 Nasal cannula was administered by
Kerwin Hernandez RN; used for procedure; Verbal order read
back and verified.
9:51:28  Pre procedure: right dorsailis pedis pulse 1+
Palpable, but thready & weak; easily obliterated
9:51:29  Modified Deep's test Ulnar < 7 seconds
9:51:31  Patient pain scale 0/10 ?.
9:51:35  Lidocaine 2% 20ml vial added to field was administered
by Kenneth Luna MD; for local anesthetic; Verbal
order read back and verified.
9:51:37  IV patent on arrival in right antecubital with 0.9%
 
NaCl at [facility].
9:51:40  Lab results completed and on chart.
9:51:43  Heparin Flush Bag (1000units/500ml NS) 2 bags added to
field was administered by Kenneth Luna MD; used for
procedure; Verbal order read back and verified.
9:51:50  Risk of Mortality: .1
9:51:52  Risk of blood transfusion: .3
9:51:53  0.9% NaCl 100 ml/hr I.V. was administered by Kerwin Hernandez RN; Per physician; Verbal order read back and
verified.
9:51:55  Risk of DEEP: 1.6
9:51:58  Right Radial & Right Groin area was prepped with
chlora-prep and draped in sterile fashion
9:51:58  Alarms reviewed by R. N.
9:51:59  Sharps counted by scrub and verified by R.N.
9:52:01  Use device set Radial Dx or PCI
9:52:02  ACIST Syringe (24353) opened to sterile field.
9:52:03  Medline Cath Pack ([id number]) opened to sterile field.
9:52:03  Bag Decanter () opened to sterile field.
9:52:05  ACIST Hand Control (26944) opened to sterile field.
9:52:05  ACIST Manifold (47112) opened to sterile field.
9:52:07  Tegaderm 4 x 4 (1626W) opened to sterile field.
9:52:07  MBrace Wrist Support ([id number]) opened to sterile
field.
9:52:08  EMERALD Guide Wire (240-948) opened to sterile field.
9:52:09  SHEATH 6FR PAULY ([id number]) opened to sterile field.
10:01:39 --------ALL STOP TIME OUT------
10:01:40 Final Timeout: patient, procedure, and site verified
with staff and physician. All members of the team are
in agreement.
10:01:43 Right Radial & Right Groin site verified by team.
10:02:01 Fire Safety Assessment: A--An alcohol-based skin
anteseptic being used preoperatively., C--Open oxygen
or nitrous oxide is being used., D--An ESU, laser, or
fiber-optic light is being used.
10:02:05 Physical assessment completed. ASA score P 2 - A
patient with mild systemic disease as per Kenneth Luna MD.
10:02:07 Versed 2 mg I.V. was administered by Kerwin Hernandez RN;
for sedation; Verbal order read back and verified.
10:02:12 1) 90+ Normal kidney functon but urine findings or
structural abnormalities or genetic trait point to
kidney disease.
10:02:14 Fentanyl 100 mcg I.V. was administered by Kerwin Hernandez
RN; for sedation; Verbal order read back and verified.
10:02:15 Maximum allowable contrast dose (3.7 X eGFR X 0.75)250
ml.
10:02:19 Sedation plan: IV Moderate Sedation Medication:Versed,
Fentanyl
10:06:24 Procedure started.
10:06:27 Versed 2 mg I.V. was administered by Kerwin Hernandez RN;
for sedation; Verbal order read back and verified.
10:06:32 Fentanyl 100 mcg I.V. was administered by Kerwin Hernandez
RN; for sedation; Verbal order read back and verified.
10:07:05 Local anesthetic to right radial artery with Lidocaine
2% by Kenneth Luna MD.**INITIAL ACCESS ONLY**
10:08:38 A 6 Fr Short sheath was inserted into the Right Radial
artery
10:09:19 Radial Cocktail (Verapamil 2mg/Nitro 400mcg/Heparin
1500units) 1 syringe I.A. was administered by Kenneth Luna MD; for vasodilation; Verbal order read back and
verified.
10:09:51 A DIAGNOSTIC Tiger 110cm 5 Fr catheter ([id number]) was
advanced over the wire and used for Procedure.
10:10:29 LV gram done using TAVARES
10:10:33 Injector settings: Ml/sec: 5, Volume: 15,
10:10:39 EF : 55 %
10:10:53 LCA angiography performed.
10:11:06 Versed 1 mg I.V. was administered by Kerwin Hernandez RN;
for sedation; Verbal order read back and verified.
10:11:10 Fentanyl 50 mcg I.V. was administered by Kerwin Hernandez
RN; for sedation; Verbal order read back and verified.
10:11:42 Use device set DRE PCI
10:11:55 INFLATOR [place] BasixCompak ([id number]) opened to sterile
field.
10:13:25 [place] Verrata Plus pressure wire (90415I) opened to
sterile field.
10:13:29 A DIAGNOSTIC AR MOD 5Fr Catheter ([id number]) was
advanced over the wire and used for Procedure.
10:14:08 RCA angiography performed.
10:14:58 GUIDE 6FR AR 2.0 SH catheter ([id number]) opened to
sterile field.
10:14:59 GUIDE 6FR XBLAD 3.5 catheter ([id number]) opened to
sterile field.
10:15:00 CHOICE PT Extra Support 182cm wire ([id number]) opened
to sterile field.
10:15:15 Catheter removed.
10:15:17 **ACC**Dominant side:Left
10:15:19 Proceeding to intervention.
10:15:37 PCI Cath status Elective
10:15:58 6 Fr XBLAD 3.5 guide catheter was inserted over the
wire
10:17:09 FFR/IFR wire advanced.
10:17:11 Heparin Bolus 4000 units I.V. was administered by
Kerwin Hernandez RN; for anticoagulation; verified with dr luna Verbal order read back and verified.
10:17:35 Wire advanced across lesion.
10:18:52 Integrilin (Bolus 2mg/ml) 9.5 ml I.V. was administered
by Kerwin Hernandez RN; for antiplatelet therapy; wasted
0.5 ml of vial Verbal order read back and verified.
10:19:11 mLAD lesion measured at 0.54 with IFR
10:19:39 **ACC** Pre-intervention GI Flow is 3.
10:19:45 Pre PCI Site: Native mLAD has 90% stenosis.
10:20:42 Place stent Inflation Number: 1 A CANDIDO RX 3.0 x 18
stent ([id number]) was prepped and advanced across
the Mid LAD . The stent was deployed at 17 CARMEN for
0:00 (min:sec) .
10:21:09 Inflation number: 2 The stent balloon was then
re-inflated across the Mid LAD to 15 CARMEN for 0:00
(min:sec) .
10:21:20 **ACC** Post-intervention GI Flow is 3.
10::25 Stent catheter was removed intact over wire.
10::28 Wire removed.
10::30 Guide catheter removed.
10::42 6 Fr AR 2 SH guide catheter was inserted over the wire
10:: CHOICE PT ES wire advanced.
10::23 **ACC** Pre-intervention IG Flow is 3.
10:22:25 Pre PCI Site: Native mRCA has 90% stenosis.
10::35 Versed 1 mg I.V. was administered by Kerwin Hernandez RN;
for sedation; Verbal order read back and verified.
 
10::38 Fentanyl 50 mcg I.V. was administered by Kerwin Hernandez
RN; for sedation; Verbal order read back and verified.
10:25:07 Place stent Inflation Number: 1 A CANDIDO RX 3.0 x 30
stent ([id number]) was prepped and advanced across
the Mid RCA . The stent was deployed at 19 CARMEN for
0:05 (min:sec) .
10:25:18 Inflation number: 2 The stent balloon was then
re-inflated across the Mid RCA to 19 CARMEN for 0:00
(min:sec) .
10:26:06 Stent catheter was removed intact over wire.
10:26:54 Place stent Inflation Number: 1 A CANDIDO RX 3.0 x 15
stent ([id number]) was prepped and advanced across
the Prox RCA . The stent was deployed at 19 CARMEN for
0:00 (min:sec) .
10:27:13 Stent catheter was removed intact over wire.
10:28:47 Inflate balloon Inflation number: 1 A NC EUPHORA 3.5 x
15 balloon ([id number]) was prepped and advanced
across the Prox RCA , then inflated to 23 CARMEN for 0:10
(min:sec) .
10:30:13 ACT drawn and resulted at out of range high seconds.
(normal therapeutic range 180-240 seconds).
10:30:15 Inflation number: 3 The NC EUPHORA 3.5 x 15 balloon
([id number]) was reinflated across the Prox RCA , to
19 CARMEN for 0:00 (min:sec) .
10:30:21 Inflation number: 3 The NC EUPHORA 3.5 x 15 balloon
([id number]) was reinflated across the Mid RCA , to 19
CARMEN for 0:00 (min:sec) .
10:30:34 Balloon removed over the wire.
10:30:35 Wire removed.
10:30:35 Guide catheter removed.
10:31:01 **ACC** Post-intervention GI Flow is 3.
10:31:10 ZEPHYR LARGE TR BAND ([id number]) opened to sterile field.
10:32:54 Plavix 600 mg P.O. was administered by Kerwin Hernandez RN;
for antiplatelet therapy; Verbal order read back and
verified.
10:33:04 Sheath removed intact; hemostasis achieved with Manual
Compression to the Right Radial artery.
10:33:06 Procedure ended.(Physican Out)
10:33:16 Fluoroscopy time 08.80 minutes.
10:33:19 Fluoroscopy dose: 1332 mGy
10:33:19 Flurop Dose total: 1332
10:33:23 Dose Area Product 44799 mGy/cm.
10:33:28 Contrast amount:Isovue 300 171ml.
10:33:31 Maximum allowable dose exceeded? No.
10:33:32 Sharps counted by scrub and verified by R.N.
10:33:37 [place] band inflated with 10cc of air.
10:33:39 Insertion/operative site no bleeding no hematoma.
10:33:47 Post right radial artery:stable, soft, clean and dry
10:33:49 Post Procedure Pulses reassessed and unchanged
10:33:53 Post procedure: right radial pulse 2+ Normal; easily
identifiable; not easily obliterated.
10:33:56 Post-procedure physical assessment completed. ASA
score P 2 - A patient with mild systemic disease as
per Kenneth Luna MD.
10:33:59 Post procedure rhythm: unchanged.
10:34:03 Estimated blood loss: 10 ml
10:34:05 Post procedure instruction explained to
patient.Patient verbalizes understanding.
10:34:06 Patient needs reinforcement of post procedure
teaching.
 
10:35:51 Procedure type changed to Cath procedure, Diagnostic
procedure, C, [facility] w/Coronaries, FFR/IVUS, FFR
Initial, Sedation Charges, Moderate Sedation up to 30
minutes, PCI procedure, Coronary Stent, Coronary Stent
Initial x2, Hemochron ACT Test
10:36:50 Procedure and supply charges have been captured,
reviewed, submitted and are correct.
10:36:54 Procedure Complication : No complications
10:37:01 Vital chart was stopped
10:37:02 [facility] Findings: MVD- PCI performed (see procedure note)
10:37:11 Operative report dictated upon procedure completion.
10:37:11 See physician's report for complete and final results.
10:37:33 Report given to Pre/Post Procedure Room.
10:37:40 Patient transfered to Pre/Post Procedure Room with
Stretcher.
10:37:42 Procedure ended.
10:37:42 Full Disclosure recording stopped
10:37:50 **ACC-PCI Only** Patient was given prescriptions, or
instructed by Kenneth uLna MD to start/continue the
following medications upon discharge: Plavix
10:37:51 End room use (Document Last)
10:37:59 End room use (Document Last)
Intervention Summary
Intervention Notes
Time     ActionType  Lesion and  Equipment Used Action#  Pressure  Duration
Attributes
10:20:42 Place stent Mid LAD     CANDIDO RX 3.0 x  1        17        00:00
18 stent
([id number])
10:21:09 Reinflate   Mid LAD     CANDIDO RX 3.0 x  2        15        00:00
stent                   18 stent
balloon                 ([id number])
10:25:07 Place stent Mid RCA     CANDIDO RX 3.0 x  1        19        00:05
30 stent
([id number])
10:25:18 Reinflate   Mid RCA     CANDIDO RX 3.0 x  2        19        00:00
stent                   30 stent
balloon                 ([id number])
10:26:54 Place stent Prox RCA    CANDIDO RX 3.0 x  1        19        00:00
15 stent
([id number])
10:28:47 Inflate     Prox RCA    NC EUPHORA 3.5 1        23        00:10
balloon                 x 15 balloon
([id number])
10:30:15 Reinflate   Prox RCA    NC EUPHORA 3.5 3        19        00:00
balloon                 x 15 balloon
([id number])
10:30:21 Reinflate   Mid RCA     NC EUPHORA 3.5 3        19        00:00
balloon                 x 15 balloon
([id number])
Device Usage
Item Name      Manufacture  Quantity  Catalog Number Hospital Part       Current
 Minimal Lot# /
Charge   Number     Stock   Stock   Serial#
Code
ACIST Syringe  Acist        1         89269          348073   860723     320571 
 20
(49442)        Medical
Systems Inc
Medline Cath   Medline      1         [id number]      852640   18059      298529 
 
 5
Pack
([id number])
Bag Decanter   Microtek     1         S          782550   93153      959498 
 5
()        Medical Inc.
ACIST Hand     Acist        1         96666          247956   659415     621912 
 5
Control        Medical
(95284)        Systems Inc
ACIST Manifold Acist        1         71066          537105   977430     316005 
 5
(01730)        Medical
Systems Inc
Tegaderm 4 x 4 3M           1         1626W          341499   142466     452115 
 5
(1626W)
MBrace Wrist   Advanced     1         [phone]    886361   74009      503942 
 5
Support        Vascular
(673420188)    Dynamics
EMERALD Guide  Cardinal     1         502-455        045015   618010     277857 
 5
Wire (502-455) Health
SHEATH 6FR     Cardinal     1         7141319        771292   6131635    683327 
 5
RAIN (5552431) Health
DIAGNOSTIC     Terumo       1                 234235   493267     459690 
 5
Tiger 110cm 5
Fr catheter
(134395)
INFLATOR Merit Merit        1         QE2824         802082   267482     914045 
 15
[facility]    Medical
([id number])
Volcano        [place]      1         41617N         225050   735920983  456603 
 5
Verrata Plus
pressure wire
(05379E)
DIAGNOSTIC AR  Cardinal     1         360991J        878469   066435     592948 
 15
MOD 5Fr        Health
Catheter
(627262J)
GUIDE 6FR AR   Medtronic    1         [id number]       471981   54790      062421 
 1
2.0 SH
catheter
([id number])
GUIDE 6FR      Cardinal     1         [id number]       303439   222427     447242 
 10
XBLAD 3.5      Health
catheter
(96043816)
CHOICE PT      [place]       1         [id number]  640889   241320     950591 
 5
Extra Support  Scientific
182cm wire
 
([id number])
CANDIDO RX 3.0 x  Medtronic    1         [id number]   279045   4720263    633339 
 5       [id number]
18 stent
([id number])
CANDIDO RX 3.0 x  Medtronic    1         [id number]   400968   9307315    981919 
 5       [id number]
30 stent
([id number])
CANDIDO RX 3.0 x  Medtronic    1         [id number]   004991   7063140    433068 
 5       [id number]
15 stent
([id number])
NC EUPHORA 3.5 Medtronic    1         [id number]     997995   752990     946925 
 1       [id number]
x 15 balloon
([id number])
ZEPHYR LARGE   Cardinal     1         890434         536833   4981065    554985 
 5
Our [facility]
([id number])
Signature Audit Brinklow
Stage           Time        Signature      Unsigned
Intra-Procedure 3/20/2020   Iris Diaz
10:38:33 AM RN
Intra-Procedure 3/20/2020   Kerwin Hernandez RN
10:39:00 AM
Intra-Procedure 3/20/2020   Kenneth Luna
10:39:40 AM MD
 
 
 
 
 
 
 
 
 
 
 
 
 
 
 
 
 
 
 
 
 
 
 
 
 
 
[facility]                                          
[address] [facility] [place] [address]

## 2020-03-20 NOTE — NUR
REC TO ROOM FROM CATH LAB VIA STRETCHER. MONITORING INITIATED. R
RADIAL BAND INTACT, RADIAL PULSE PALPABLE ABOVE AND BELOW BAND.
INSTRUCTED NOT TO USE R HAND, AND THAT HE IS ON BEDREST FOR THE NEXT
THREE AND A HALF HOURS. WIFE AND PT VERBALIZE UNDERSTANDING. NSR 84,
/73, RR 14, % 2LNC.

## 2020-03-20 NOTE — NUR
8ML TOTAL OUT R RADIAL BAND, NO S/S BLEEDING OR HEMATOMA. IV DC, TIP
INTACT, PT DRESSING W ASSIST OF WIFE.

## 2020-03-20 NOTE — NUR
R RADIAL ARTERY PALPABLE ABOVE AND BELOW BAND. NO S/S BLEEDING OR
HEMATOMA, NO C/O UNUSUAL SENSATION

## 2020-03-20 NOTE — NUR
R RADIAL PULSE PALPABLE ABOVE AND BELOW BAND, NO S/S BLEEDING OR
HEMATOMA. /70, NSR 77, SAT 97% ON RA.

## 2020-03-20 NOTE — NUR
AMBULATED TO REST ROOM WITHOUT DIFFICULTY. BACK TO ROOM, R RADIAL
BAND REMOVED, ACCESS SITE COVERED WITH 2X2 AND TEGADERM. NO S/S
BLEEDING OR HEMATOMA. DC INSTRUCTIONS DISCUSSED WITH PT AND WIFE. PT
DC HOME VIA WHEELCHAIR TO PRIVATE CAR WITH WIFE, PT HAS ALL
BELONGINGS.
